# Patient Record
Sex: FEMALE | Race: WHITE
[De-identification: names, ages, dates, MRNs, and addresses within clinical notes are randomized per-mention and may not be internally consistent; named-entity substitution may affect disease eponyms.]

---

## 2019-11-12 ENCOUNTER — HOSPITAL ENCOUNTER (INPATIENT)
Dept: HOSPITAL 25 - MCHOBOUT | Age: 24
LOS: 5 days | Discharge: HOME | DRG: 540 | End: 2019-11-17
Attending: OBSTETRICS & GYNECOLOGY | Admitting: MIDWIFE
Payer: COMMERCIAL

## 2019-11-12 DIAGNOSIS — O61.0: ICD-10-CM

## 2019-11-12 DIAGNOSIS — Z3A.41: ICD-10-CM

## 2019-11-12 DIAGNOSIS — D64.9: ICD-10-CM

## 2019-11-12 LAB
ALBUMIN SERPL BCG-MCNC: 3.2 G/DL (ref 3.2–5.2)
ALBUMIN/GLOB SERPL: 1.1 {RATIO} (ref 1–3)
ALP SERPL-CCNC: 133 U/L (ref 34–104)
ALT SERPL W P-5'-P-CCNC: 10 U/L (ref 7–52)
ANION GAP SERPL CALC-SCNC: 8 MMOL/L (ref 2–11)
AST SERPL-CCNC: 15 U/L (ref 13–39)
BASOPHILS # BLD AUTO: 0 10^3/UL (ref 0–0.2)
BUN SERPL-MCNC: 10 MG/DL (ref 6–24)
BUN/CREAT SERPL: 16.9 (ref 8–20)
CALCIUM SERPL-MCNC: 9.2 MG/DL (ref 8.6–10.3)
CHLORIDE SERPL-SCNC: 105 MMOL/L (ref 101–111)
EOSINOPHIL # BLD AUTO: 0 10^3/UL (ref 0–0.6)
GLOBULIN SER CALC-MCNC: 3 G/DL (ref 2–4)
GLUCOSE SERPL-MCNC: 132 MG/DL (ref 70–100)
HCO3 SERPL-SCNC: 22 MMOL/L (ref 22–32)
HCT VFR BLD AUTO: 31 % (ref 35–47)
HGB BLD-MCNC: 10.4 G/DL (ref 12–16)
LYMPHOCYTES # BLD AUTO: 1.9 10^3/UL (ref 1–4.8)
MCH RBC QN AUTO: 29 PG (ref 27–31)
MCHC RBC AUTO-ENTMCNC: 34 G/DL (ref 31–36)
MCV RBC AUTO: 85 FL (ref 80–97)
MONOCYTES # BLD AUTO: 0.7 10^3/UL (ref 0–0.8)
NEUTROPHILS # BLD AUTO: 8.2 10^3/UL (ref 1.5–7.7)
NRBC # BLD AUTO: 0 10^3/UL
NRBC BLD QL AUTO: 0
PLATELET # BLD AUTO: 199 10^3/UL (ref 150–450)
POTASSIUM SERPL-SCNC: 4.1 MMOL/L (ref 3.5–5)
PROT SERPL-MCNC: 6.2 G/DL (ref 6.4–8.9)
RBC # BLD AUTO: 3.59 10^6 /UL (ref 3.7–4.87)
SODIUM SERPL-SCNC: 135 MMOL/L (ref 135–145)
WBC # BLD AUTO: 10.9 10^3/UL (ref 3.5–10.8)

## 2019-11-12 PROCEDURE — 36415 COLL VENOUS BLD VENIPUNCTURE: CPT

## 2019-11-12 PROCEDURE — 86901 BLOOD TYPING SEROLOGIC RH(D): CPT

## 2019-11-12 PROCEDURE — 86900 BLOOD TYPING SEROLOGIC ABO: CPT

## 2019-11-12 PROCEDURE — 80307 DRUG TEST PRSMV CHEM ANLYZR: CPT

## 2019-11-12 PROCEDURE — S0191 MISOPROSTOL, ORAL, 200 MCG: HCPCS

## 2019-11-12 PROCEDURE — 86850 RBC ANTIBODY SCREEN: CPT

## 2019-11-12 PROCEDURE — 85025 COMPLETE CBC W/AUTO DIFF WBC: CPT

## 2019-11-12 PROCEDURE — 80053 COMPREHEN METABOLIC PANEL: CPT

## 2019-11-12 NOTE — PN
Progress Note





- Progress Note


Date of Service: 19


SOAP: 


Subjective:


Pt reports mild ctx in lower abdomen with some back cramping.  Pt denies LOF, 

VB or HA/ epigastric pain.  Pt reports + FM.





Objective:


BP:142/76, P:97, R:18, T:98.8


FHR: 150bpm, + accels, -decels, moderate variability


Cervix: -2





 Laboratory Tests











  19





  13:27 13:27


 


WBC   10.9 H


 


RBC   3.59 L


 


Hgb   10.4 L


 


Hct   31 L


 


MCV   85


 


MCH   29


 


MCHC   34


 


Plt Count   199


 


Sodium  135 


 


Potassium  4.1 


 


Chloride  105 


 


Carbon Dioxide  22 


 


Anion Gap  8 


 


BUN  10 


 


Creatinine  0.59 


 


Est GFR ( Amer)  152.8 


 


Est GFR (Non-Af Amer)  126.3 


 


BUN/Creatinine Ratio  16.9 


 


Glucose  132 H 


 


Calcium  9.2 


 


Total Bilirubin  0.20 


 


AST  15 


 


ALT  10 


 


Alkaline Phosphatase  133 H 


 


Total Protein  6.2 L 


 


Albumin  3.2 


 


Globulin  3.0 


 


Albumin/Globulin Ratio  1.1 











Assessment:


24 y.o. , 40w5d EGA, preeclampsia, GDM (diet controlled)





Plan:


1)   Counseled on benefits/risks and pt consents to continue with Villanueva 

insertion. Villanueva Balloon placed. 


2)   Therapeutic rest and hydrotherapy PRN


3)   Questions answered to pt satisfaction.

## 2019-11-12 NOTE — HP
General Information





- Reason for Visit


Pt presents to L&D for IOL for full term pregnancy and preeclampsia.  Pt denies 

HA or epigastric pain, pt reports increased edema of feet and face.  Pt denies 

LOF or VB.  Pt reports +FM.








- General Information


Maternal Age: 24


Grav: 2


Para: 0


SAB: 1


IEA: 0





Estimated Due Date: 19


Determined By: LMP


Gestational Age in Weeks/Days: 40w 5d


Maternal Blood Type and Rh: B Positive





- Results this Pregnancy


Serology/RPR Result: Non-Reactive


Rubella Result: Immune


HBsAg Result: Negative


HIV Result: Negative


GBS Culture Result: Negative





Past Medical History


Pertinent Past Medical History: Non-Contributory


Pertinent Past Surgical History: None


Pertinent Family History: See  Records - PGM: DM





- Antepartal Records


Antepartal Records: Reviewed, Pregnancy Complicated by: - BMI 34, anemia, 

Gestational diabetes diet controlled, preeclampsia





Review of Systems


Constitutional: Comfortable


CV Complaint: No


Respiratory: Shortness of Breath: No


Gastrointestinal: No Nausea/Vomiting, Normal Bowel Movement


Genitourinary: No Dysuria, No Bleeding, No Leaking Fluid


Musculoskeletal: No Complaint, No Epigastric Pain


Neurological: No Headache, No Visual Changes


Fetal Movement: Normal





Exam


Allergies/Adverse Reactions: 


Allergies





No Known Allergies Allergy (Verified 19 11:35)


 











T:99.1, P:86, R:20, BP:165/80 repeat 146/77, O2:100%


Lab Values - Entire Visit: 


CBC, type and screen and CMP pending.  CBC/CMP from 19: WNL, 24hr urine: 

300








- Measurements


Height: 5 ft 2 in


Weight: 200 lb


Weight in lbs: 200.006347


Body Mass Index (BMI): 36.6


Pre-Pregnancy Weight: 185 lb





- Exam


Breast: Breast Exam Deferred


CVA: No CVA Tenderness


Extremities: No Edema


Heart: Normal Rhythm/Heart Sounds


HEENT: No Significant Findings


Lungs: Clear Bilaterally


Rectal: Rectal Exam Deferred


Reflexes: DTR 2+


Thyroid: No Thyromegaly





- Abdominal Exam


Abdomen Exam: Fundal Height Consistent with Dates





- Ultrasound/Biophysical Profile


Ultrasound Status: Not Done





Targeted Exam Findings


Estimated Fetal Weight: [id number]


Cervical Exam: Fingertip


Effacement: Thick


Station: -2


Presenting Part: Vertex


Membrane Status: Intact


Bleeding/Discharge: None





EFM Findings





- External Fetal Monitor Findings


Baseline Fetal Heart Rate: 150


External Fetal Monitor Findings: Accelerations Present, No Pattern of Variable 

or Late Decelerations, Variability Moderate, Baseline Stable


Contractions: Irregular, Mild, < 45 Seconds





Assessment/Plan





- Assessment





24 y.o. , 40w5d EGA, preeclampsia, gestational diabetes (diet controlled)

, BMI 34, Cat I NST





- Obstetrical Risk Factors


Obstetrical Risk Factors: Obesity, PreEclampsia, Gestational Diabetes





- Plan


Plan: Induction





- Date/Time of Admission


Date of Admission: 19


Time of Admission: 13:00

## 2019-11-13 LAB
BASOPHILS # BLD AUTO: 0.1 10^3/UL (ref 0–0.2)
EOSINOPHIL # BLD AUTO: 0 10^3/UL (ref 0–0.6)
HCT VFR BLD AUTO: 31 % (ref 35–47)
HGB BLD-MCNC: 10.2 G/DL (ref 12–16)
LYMPHOCYTES # BLD AUTO: 2.7 10^3/UL (ref 1–4.8)
MCH RBC QN AUTO: 28 PG (ref 27–31)
MCHC RBC AUTO-ENTMCNC: 33 G/DL (ref 31–36)
MCV RBC AUTO: 85 FL (ref 80–97)
MONOCYTES # BLD AUTO: 1.4 10^3/UL (ref 0–0.8)
NEUTROPHILS # BLD AUTO: 10.6 10^3/UL (ref 1.5–7.7)
NRBC # BLD AUTO: 0 10^3/UL
NRBC BLD QL AUTO: 0.1
PLATELET # BLD AUTO: 206 10^3/UL (ref 150–450)
RBC # BLD AUTO: 3.64 10^6 /UL (ref 3.7–4.87)
WBC # BLD AUTO: 14.8 10^3/UL (ref 3.5–10.8)

## 2019-11-13 NOTE — PN
Progress Note





- Progress Note


Date of Service: 19


SOAP: 


Subjective:


Pt reports contractions are increasingly more intense, nitrous was working well 

but pt now requests epidural.





Objective:


BP:155/92, P:89, T:97.0, FHR: 160bpm, + accels, -decels, moderate variability


cervix:5cm/80/-1


pitocin at 4


Assessment:


24 y.o. , 40w6d EGA, PEC, GDM diet controlled,  induction of labor with 

adequate ctx





Plan:


1) Anesthesia consult


2) Encourage position changes and rest


3) Sign out to Tricia Mohamud

## 2019-11-13 NOTE — PN
Progress Note





- Progress Note


Date of Service: 11/13/19


Note: 


Pt reports mild irregular cramping and slight increase in pressure.  Pt denies 

contractions.  Reviewed options for continued IOL for preeclampsia and pt 

agrees to continue with low dose pitocin.  Reviewed R/B.

## 2019-11-13 NOTE — PN
Progress Note





- Progress Note


Date of Service: 19


SOAP: 


Subjective:


Pt  denies contractions, headache, epigastric pain or any other concerns.








Objective:


149/83, P: 105, T:98.9, FHR:160bpm, + accels, -decels, moderate variability, 

AROM- possible thin meconium


cervix: 5cm/70/-1





Assessment:


24 y.o. , 40w6d EGA, induction of labor for preeclampsia





Plan:


1)  AROM


2)  Start low dose pitocin in 2 hrs if no active labor

## 2019-11-13 NOTE — PN
Progress Note





- Progress Note


Date of Service: 19


SOAP: 


Subjective:


Pt reports contractions are increasing in intensity and there is more pressure. 

Pt coping well.








Objective:


FHR: 150bpm, + accels, -decels, moderate variability, ctx q 2-3min  pitocin at 

4.


BP:141/68, P:94, R:18, T:98.7





Assessment:


24 y.o. , Preeclampsia, cat I NST, early labor





Plan:


1) Cont pitocin


2) Encourage position changes


3) Hydrotherapy PRN


4) Reevaluate in 2 hrs or sooner PRN

## 2019-11-14 PROCEDURE — 10907ZC DRAINAGE OF AMNIOTIC FLUID, THERAPEUTIC FROM PRODUCTS OF CONCEPTION, VIA NATURAL OR ARTIFICIAL OPENING: ICD-10-PCS | Performed by: OBSTETRICS & GYNECOLOGY

## 2019-11-14 PROCEDURE — 3E033VJ INTRODUCTION OF OTHER HORMONE INTO PERIPHERAL VEIN, PERCUTANEOUS APPROACH: ICD-10-PCS | Performed by: OBSTETRICS & GYNECOLOGY

## 2019-11-14 RX ADMIN — SIMETHICONE CHEW TAB 80 MG SCH MG: 80 TABLET ORAL at 21:14

## 2019-11-14 RX ADMIN — KETOROLAC TROMETHAMINE SCH: 30 INJECTION, SOLUTION INTRAMUSCULAR; INTRAVENOUS at 11:00

## 2019-11-14 RX ADMIN — ACETAMINOPHEN SCH MG: 325 TABLET ORAL at 21:14

## 2019-11-14 RX ADMIN — KETOROLAC TROMETHAMINE SCH MG: 30 INJECTION, SOLUTION INTRAMUSCULAR; INTRAVENOUS at 16:03

## 2019-11-14 RX ADMIN — ACETAMINOPHEN SCH: 325 TABLET ORAL at 12:29

## 2019-11-14 RX ADMIN — SIMETHICONE CHEW TAB 80 MG SCH MG: 80 TABLET ORAL at 18:42

## 2019-11-14 RX ADMIN — DOCUSATE SODIUM SCH MG: 100 CAPSULE, LIQUID FILLED ORAL at 14:15

## 2019-11-14 RX ADMIN — DOCUSATE SODIUM SCH MG: 100 CAPSULE, LIQUID FILLED ORAL at 21:14

## 2019-11-14 RX ADMIN — KETOROLAC TROMETHAMINE SCH MG: 30 INJECTION, SOLUTION INTRAMUSCULAR; INTRAVENOUS at 23:56

## 2019-11-14 RX ADMIN — SIMETHICONE CHEW TAB 80 MG SCH: 80 TABLET ORAL at 12:30

## 2019-11-14 NOTE — PN
Progress Note





- Progress Note


Date of Service: 19


Note: 





Subjective:


Pt more uncomfortable with contractions. 





Objective:


BP:140/73, P:98


FHR: 155 bpm, + accels, -decels, moderate variability


cervix: 5/80/-1, vtx


pitocin at 2


IUPC placed without difficulty





Assessment:


24 y.o. , 40w6d EGA, PEC, GDM diet controlled


Induction of labor 


No evidence of fetal acidemia


VSS





Plan:


Monitor contractions for adequacy 


If adequate contractions and no cervical change.  delivery indicated. 


Will given report to DARCY Gonzalez MD

## 2019-11-14 NOTE — PN
Progress Note





- Progress Note


Date of Service: 19


Note: 





SOAP: 


Subjective:


Pt sleeping soundly. 





Objective:


BP:140/73, P:98


FHR: 160bpm, + accels, -decels, moderate variability


cervix: deferred


pitocin at 4





Assessment:


24 y.o. , 40w6d EGA, PEC, GDM diet controlled


induction of labor with adequate ctx


No evidence of fetal acidemia


VSS





Plan:


1) Continue to titrate pitocin as tolerated 


2) VE when pt awake


3) Anticipate progression to

## 2019-11-14 NOTE — OP
DATE OF OPERATION:  19

 

DATE OF BIRTH:  [birth year]

 

SURGEON:  Dr. Gonzalez.

 

ASSISTANT:  Montserrat Mckeon CNM

 

ANESTHESIA:  Epidural.

 

PRE-OP DIAGNOSES:  

1.  Arrest of dilation/descent.

2.  Gestational diabetes.

3.  Preeclampsia.

4.  Failed induction.

 

POST-OP DIAGNOSES:  

1.  Arrest of dilation/descent.

2.  Gestational diabetes.

3.  Preeclampsia.

4.  Failed induction.

 

OPERATIVE PROCEDURE:  Low-transverse  section.

 

ESTIMATED BLOOD LOSS:  600 cc.

 

FINDINGS:  Include a variable female Apgars 9 and 10 and the weight was 7 
pounds 11 ounces.  This is a 24-year-old,  2, para 0, who presented at 
40 weeks' gestation for induction with mild preeclampsia.  She was started with 
a Cook catheter and progressed up to 5 cm.  She received an epidural and 
continued on Pitocin.  Rupture of membranes was done and the patient did not 
progress beyond that despite adequate amount of the day or units.  At the time 
of , she had normal uterus, fallopian tubes and ovaries.

 

DESCRIPTION OF PROCEDURE:  The patient identified, procedure identified as a 
lower transverse  section.  The patient was taken to the operating room
, prepped and draped in the usual fashion.  In left lateral recumbent position 
under epidural anesthesia, a Pfannenstiel incision was made in the abdomen and 
carried down through fat, fascia, and peritoneum.  A transverse incision was 
made in the lower uterine segment and extended laterally using blunt 
dissection.  The above infant was delivered through the incision with these.  
The cord was doubly clamped and cut and the infant was handed to the awaiting 
pediatrician.  Cord blood was obtained. Placenta delivered spontaneously.  The 
uterus was wiped out with wet lap sponge. The uterine incision was then closed 
using 0 Polysorb in a running fashion.  A second layer was used to imbricate 
the first layer.  Uterine atony was noted during the procedure and she was 
given Methergine and misoprostol rectally.  The uterus was placed back in the 
abdominal cavity.  The gut was wiped out with a wet lap sponge.  Good 
hemostasis was noted in the intraperitoneal space and the peritenoneum was then 
closed using 3-0 Polysorb in a running fashion.  Hemostasis was achieved in the 
subrectus layers.  The fascia was closed using 0 Polysorb in a running fashion.
  Good hemostasis achieved in subcu.  The 3-0 Vicryl was used to close the dead 
space and then 4-0 Monocryl was used to close the skin in a subcuticular 
fashion.  Mastisol and Steri-Strips were applied.  The patient returned to the 
recovery room in stable condition.Sponge and instrument counts were correct.

 

 [id number]/CPS #: [id number]

[facility]

## 2019-11-15 LAB
BASOPHILS # BLD AUTO: 0 10^3/UL (ref 0–0.2)
EOSINOPHIL # BLD AUTO: 0 10^3/UL (ref 0–0.6)
HCT VFR BLD AUTO: 21 % (ref 35–47)
HGB BLD-MCNC: 7.1 G/DL (ref 12–16)
LYMPHOCYTES # BLD AUTO: 1.3 10^3/UL (ref 1–4.8)
MCH RBC QN AUTO: 30 PG (ref 27–31)
MCHC RBC AUTO-ENTMCNC: 34 G/DL (ref 31–36)
MCV RBC AUTO: 87 FL (ref 80–97)
MONOCYTES # BLD AUTO: 1 10^3/UL (ref 0–0.8)
NEUTROPHILS # BLD AUTO: 10.5 10^3/UL (ref 1.5–7.7)
NRBC # BLD AUTO: 0 10^3/UL
NRBC BLD QL AUTO: 0
PLATELET # BLD AUTO: 147 10^3/UL (ref 150–450)
RBC # BLD AUTO: 2.4 10^6 /UL (ref 3.7–4.87)
WBC # BLD AUTO: 12.9 10^3/UL (ref 3.5–10.8)

## 2019-11-15 RX ADMIN — KETOROLAC TROMETHAMINE SCH MG: 30 INJECTION, SOLUTION INTRAMUSCULAR; INTRAVENOUS at 09:04

## 2019-11-15 RX ADMIN — Medication SCH MG: at 21:01

## 2019-11-15 RX ADMIN — ACETAMINOPHEN SCH MG: 325 TABLET ORAL at 04:55

## 2019-11-15 RX ADMIN — OXYCODONE HYDROCHLORIDE AND ACETAMINOPHEN PRN TAB: 5; 325 TABLET ORAL at 12:02

## 2019-11-15 RX ADMIN — DOCUSATE SODIUM SCH MG: 100 CAPSULE, LIQUID FILLED ORAL at 21:01

## 2019-11-15 RX ADMIN — SIMETHICONE CHEW TAB 80 MG SCH MG: 80 TABLET ORAL at 18:12

## 2019-11-15 RX ADMIN — SIMETHICONE CHEW TAB 80 MG SCH MG: 80 TABLET ORAL at 21:01

## 2019-11-15 RX ADMIN — SIMETHICONE CHEW TAB 80 MG SCH MG: 80 TABLET ORAL at 09:05

## 2019-11-15 RX ADMIN — SIMETHICONE CHEW TAB 80 MG SCH MG: 80 TABLET ORAL at 12:03

## 2019-11-15 RX ADMIN — IBUPROFEN PRN MG: 600 TABLET, FILM COATED ORAL at 18:13

## 2019-11-15 RX ADMIN — DOCUSATE SODIUM SCH MG: 100 CAPSULE, LIQUID FILLED ORAL at 14:24

## 2019-11-15 RX ADMIN — DOCUSATE SODIUM SCH MG: 100 CAPSULE, LIQUID FILLED ORAL at 09:03

## 2019-11-16 LAB
BASOPHILS # BLD AUTO: 0 10^3/UL (ref 0–0.2)
EOSINOPHIL # BLD AUTO: 0.1 10^3/UL (ref 0–0.6)
HCT VFR BLD AUTO: 23 % (ref 35–47)
HGB BLD-MCNC: 7.6 G/DL (ref 12–16)
LYMPHOCYTES # BLD AUTO: 1.8 10^3/UL (ref 1–4.8)
MCH RBC QN AUTO: 29 PG (ref 27–31)
MCHC RBC AUTO-ENTMCNC: 33 G/DL (ref 31–36)
MCV RBC AUTO: 87 FL (ref 80–97)
MONOCYTES # BLD AUTO: 1 10^3/UL (ref 0–0.8)
NEUTROPHILS # BLD AUTO: 7.1 10^3/UL (ref 1.5–7.7)
NRBC # BLD AUTO: 0 10^3/UL
NRBC BLD QL AUTO: 0.1
PLATELET # BLD AUTO: 184 10^3/UL (ref 150–450)
RBC # BLD AUTO: 2.64 10^6 /UL (ref 3.7–4.87)
WBC # BLD AUTO: 10 10^3/UL (ref 3.5–10.8)

## 2019-11-16 RX ADMIN — SIMETHICONE CHEW TAB 80 MG SCH MG: 80 TABLET ORAL at 21:36

## 2019-11-16 RX ADMIN — Medication SCH MG: at 08:22

## 2019-11-16 RX ADMIN — DOCUSATE SODIUM SCH MG: 100 CAPSULE, LIQUID FILLED ORAL at 21:36

## 2019-11-16 RX ADMIN — DOCUSATE SODIUM SCH MG: 100 CAPSULE, LIQUID FILLED ORAL at 08:22

## 2019-11-16 RX ADMIN — IBUPROFEN PRN MG: 600 TABLET, FILM COATED ORAL at 08:22

## 2019-11-16 RX ADMIN — OXYCODONE HYDROCHLORIDE AND ACETAMINOPHEN PRN TAB: 5; 325 TABLET ORAL at 00:07

## 2019-11-16 RX ADMIN — Medication SCH MG: at 21:36

## 2019-11-16 RX ADMIN — SIMETHICONE CHEW TAB 80 MG SCH MG: 80 TABLET ORAL at 14:12

## 2019-11-16 RX ADMIN — ACETAMINOPHEN PRN MG: 325 TABLET ORAL at 16:57

## 2019-11-16 RX ADMIN — IBUPROFEN PRN MG: 600 TABLET, FILM COATED ORAL at 21:35

## 2019-11-16 RX ADMIN — SIMETHICONE CHEW TAB 80 MG SCH MG: 80 TABLET ORAL at 08:22

## 2019-11-16 RX ADMIN — DOCUSATE SODIUM SCH MG: 100 CAPSULE, LIQUID FILLED ORAL at 14:12

## 2019-11-16 RX ADMIN — IBUPROFEN PRN MG: 600 TABLET, FILM COATED ORAL at 14:12

## 2019-11-16 RX ADMIN — SIMETHICONE CHEW TAB 80 MG SCH MG: 80 TABLET ORAL at 18:22

## 2019-11-16 RX ADMIN — OXYCODONE HYDROCHLORIDE AND ACETAMINOPHEN PRN TAB: 5; 325 TABLET ORAL at 21:55

## 2019-11-16 RX ADMIN — ACETAMINOPHEN PRN MG: 325 TABLET ORAL at 10:45

## 2019-11-16 NOTE — PN
Progress Note





- Progress Note


Date of Service: 11/16/19


Note: 





S: POD#2 s/p pLTCS for AOD. Pt doing well, no complaints. Reports that 

breastfeeding is going well and milk production is increasing. Voiding 

spontaneously and without difficulty. Passing flatus. Denies feeling feverish. 

Denies cp, sob. 





O: AVSS


Gen: nad, aaox3, sitting up in bed nursing baby


CV: RRR


Pulm: CTABl


Abd: soft, nd, appropriately ttp,


Incision: c/d/i with sutures and steris, no erythema, induration, drainage or 

bleeding


Ext: trace edema, warm, nttp, negative kourtney's





A/P: 23 y/o POD#2 s/p pLTCS for AOD, pregnancy complicated by PreE and GDMA1


 - Temp of 101.0 this am, defervesced to 99 on re-check, though pt did receive 

Ibuprofen. Pt denies feeling feverish or chills and is asymptomatic. She is 

breastfeeding and does report milk coming in overnight. She does not appear 

toxic, all other vitals are WNL and there are no other signs/sx of infectious 

etiology. WBC this AM was WNL. Will continue to monitor closely and if spikes 

temp again will initiate further workup.


 - GDMA1 - Fasting FSBG 67 this AM. DC fingersticks.


 - Anemia - Hct 21 initially post-op --> increased to 23 this AM, continue PO fe

, pt asymptomatic. 


 - PreE - BP's normotensive, pt asymptomatic


 - Encouraged ambulation and Incentive Spirometry


 - Continue routine postpartum care





DO SANA Bang

## 2019-11-17 VITALS — DIASTOLIC BLOOD PRESSURE: 68 MMHG | SYSTOLIC BLOOD PRESSURE: 123 MMHG

## 2019-11-17 RX ADMIN — IBUPROFEN PRN MG: 600 TABLET, FILM COATED ORAL at 09:45

## 2019-11-17 RX ADMIN — SIMETHICONE CHEW TAB 80 MG SCH MG: 80 TABLET ORAL at 09:45

## 2019-11-17 RX ADMIN — Medication SCH MG: at 09:44

## 2019-11-17 RX ADMIN — DOCUSATE SODIUM SCH MG: 100 CAPSULE, LIQUID FILLED ORAL at 09:45

## 2019-11-17 RX ADMIN — ACETAMINOPHEN PRN MG: 325 TABLET ORAL at 09:44
